# Patient Record
Sex: MALE | Race: WHITE | NOT HISPANIC OR LATINO
[De-identification: names, ages, dates, MRNs, and addresses within clinical notes are randomized per-mention and may not be internally consistent; named-entity substitution may affect disease eponyms.]

---

## 2017-04-10 ENCOUNTER — RESULT REVIEW (OUTPATIENT)
Age: 67
End: 2017-04-10

## 2017-04-11 ENCOUNTER — OUTPATIENT (OUTPATIENT)
Dept: OUTPATIENT SERVICES | Facility: HOSPITAL | Age: 67
LOS: 1 days | Discharge: HOME | End: 2017-04-11
Payer: COMMERCIAL

## 2017-04-11 ENCOUNTER — TRANSCRIPTION ENCOUNTER (OUTPATIENT)
Age: 67
End: 2017-04-11

## 2017-04-11 DIAGNOSIS — D21.9 BENIGN NEOPLASM OF CONNECTIVE AND OTHER SOFT TISSUE, UNSPECIFIED: ICD-10-CM

## 2017-04-11 PROCEDURE — 88304 TISSUE EXAM BY PATHOLOGIST: CPT

## 2017-04-11 PROCEDURE — 88304 TISSUE EXAM BY PATHOLOGIST: CPT | Mod: 26

## 2017-04-11 PROCEDURE — 11420 EXC H-F-NK-SP B9+MARG 0.5/<: CPT

## 2017-04-13 LAB — SURGICAL PATHOLOGY FINAL REPORT - CH: SIGNIFICANT CHANGE UP

## 2017-04-14 DIAGNOSIS — R22.31 LOCALIZED SWELLING, MASS AND LUMP, RIGHT UPPER LIMB: ICD-10-CM

## 2017-04-14 DIAGNOSIS — L91.0 HYPERTROPHIC SCAR: ICD-10-CM

## 2017-04-14 DIAGNOSIS — Z85.850 PERSONAL HISTORY OF MALIGNANT NEOPLASM OF THYROID: ICD-10-CM

## 2017-04-14 DIAGNOSIS — E89.0 POSTPROCEDURAL HYPOTHYROIDISM: ICD-10-CM

## 2017-04-14 DIAGNOSIS — I10 ESSENTIAL (PRIMARY) HYPERTENSION: ICD-10-CM

## 2017-12-22 ENCOUNTER — RESULT REVIEW (OUTPATIENT)
Age: 67
End: 2017-12-22

## 2018-10-13 ENCOUNTER — TRANSCRIPTION ENCOUNTER (OUTPATIENT)
Age: 68
End: 2018-10-13

## 2019-04-24 ENCOUNTER — TRANSCRIPTION ENCOUNTER (OUTPATIENT)
Age: 69
End: 2019-04-24

## 2019-12-18 ENCOUNTER — APPOINTMENT (OUTPATIENT)
Dept: GASTROENTEROLOGY | Facility: CLINIC | Age: 69
End: 2019-12-18
Payer: MEDICARE

## 2019-12-18 VITALS
WEIGHT: 176 LBS | HEART RATE: 94 BPM | SYSTOLIC BLOOD PRESSURE: 149 MMHG | HEIGHT: 70 IN | DIASTOLIC BLOOD PRESSURE: 95 MMHG | BODY MASS INDEX: 25.2 KG/M2

## 2019-12-18 PROCEDURE — 99212 OFFICE O/P EST SF 10 MIN: CPT

## 2019-12-18 NOTE — HISTORY OF PRESENT ILLNESS
[de-identified] : Mr. ALO BEYER is a 69 year old male presents for screening colonoscopy. Patient has no complaints of bowel issues, bleeding, abdominal pain, family history of colon cancer, GERD symptoms. Patient had last colonoscopy in 2017. Patient has a personal history of colon polyps. \par \par

## 2019-12-18 NOTE — PHYSICAL EXAM
[General Appearance - Alert] : alert [General Appearance - In No Acute Distress] : in no acute distress [Auscultation Breath Sounds / Voice Sounds] : lungs were clear to auscultation bilaterally [Heart Rate And Rhythm] : heart rate was normal and rhythm regular [Heart Sounds] : normal S1 and S2 [Heart Sounds Gallop] : no gallops [Murmurs] : no murmurs [Heart Sounds Pericardial Friction Rub] : no pericardial rub [Abdomen Tenderness] : non-tender [Bowel Sounds] : normal bowel sounds [Abdomen Soft] : soft [] : no hepato-splenomegaly [Abdomen Mass (___ Cm)] : no abdominal mass palpated

## 2020-01-24 ENCOUNTER — RESULT REVIEW (OUTPATIENT)
Age: 70
End: 2020-01-24

## 2020-01-24 ENCOUNTER — APPOINTMENT (OUTPATIENT)
Dept: GASTROENTEROLOGY | Facility: AMBULATORY MEDICAL SERVICES | Age: 70
End: 2020-01-24
Payer: MEDICARE

## 2020-01-24 PROCEDURE — 45385 COLONOSCOPY W/LESION REMOVAL: CPT

## 2020-01-24 PROCEDURE — 45380 COLONOSCOPY AND BIOPSY: CPT | Mod: 59

## 2021-10-09 ENCOUNTER — EMERGENCY (EMERGENCY)
Facility: HOSPITAL | Age: 71
LOS: 0 days | Discharge: HOME | End: 2021-10-09
Attending: STUDENT IN AN ORGANIZED HEALTH CARE EDUCATION/TRAINING PROGRAM
Payer: MEDICARE

## 2021-10-09 VITALS
HEART RATE: 82 BPM | OXYGEN SATURATION: 96 % | HEIGHT: 71 IN | TEMPERATURE: 98 F | WEIGHT: 179.9 LBS | RESPIRATION RATE: 16 BRPM | SYSTOLIC BLOOD PRESSURE: 144 MMHG | DIASTOLIC BLOOD PRESSURE: 84 MMHG

## 2021-10-09 DIAGNOSIS — T18.128A FOOD IN ESOPHAGUS CAUSING OTHER INJURY, INITIAL ENCOUNTER: ICD-10-CM
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
## 2021-10-09 DIAGNOSIS — Z85.850 PERSONAL HISTORY OF MALIGNANT NEOPLASM OF THYROID: ICD-10-CM

## 2021-10-09 DIAGNOSIS — I10 ESSENTIAL (PRIMARY) HYPERTENSION: ICD-10-CM

## 2021-10-09 PROCEDURE — 99282 EMERGENCY DEPT VISIT SF MDM: CPT

## 2021-10-09 NOTE — ED ADULT NURSE NOTE - OBJECTIVE STATEMENT
pt presents to er for evaluation of foreign body in throat. aaox4, no respiratiory distress. trachea midline, respirations even and unlabored.

## 2021-10-09 NOTE — ED STATDOCS - ATTENDING CONTRIBUTION TO CARE
I, Annie Mcduffie DO,  performed the initial face to face bedside interview with this patient regarding history of present illness, review of symptoms and relevant past medical, social and family history.  I completed an independent physical examination.  I was the initial provider who evaluated this patient. I have signed out the follow up of any pending tests (i.e. labs, radiological studies) to the ACP.  I have communicated the patient’s plan of care and disposition with the ACP.  The history, relevant review of systems, past medical and surgical history, medical decision making, and physical examination was documented by the scribe in my presence and I attest to the accuracy of the documentation.

## 2021-10-09 NOTE — ED STATDOCS - NSICDXPASTSURGICALHX_GEN_ALL_CORE_FT
PAST SURGICAL HISTORY:  Eye symptom lasik procedure x 5 years ago    Pilonidal cyst removed at age 30s    S/P foot surgery 2009 bilateral feet with hardware placement to metatarsals    S/P shoulder surgery 2011 bilateral for bone spurs    S/P thyroidectomy x 22 years ago

## 2021-10-09 NOTE — ED STATDOCS - PATIENT PORTAL LINK FT
You can access the FollowMyHealth Patient Portal offered by [facility] by registering at the following website: [email or web address]. By joining [facility]’s FollowMyHealth portal, you will also be able to view your health information using other applications (apps) compatible with our system.

## 2021-10-09 NOTE — ED STATDOCS - PROGRESS NOTE DETAILS
Jessica Mcduffie   Pt able to PO Challenge without difficulty. 72 y/o M presents with throat fb. pt was eating shrimp and felt a piece got stuck. Pt feels like food went down on his way to ED. No other complaints at this time. -Jose Pope PA-C pt feeling better, tolerating PO. -Jose Pope PA-C Reta DO: Patient to see GI- Dr. Tesfaye- patient's private physician in 1-2 days without fail; strict return precautions given.

## 2021-10-09 NOTE — ED STATDOCS - NSFOLLOWUPINSTRUCTIONS_ED_ALL_ED_FT
Log Out.      [employer]® CareNotes®     :  [facility]  	                       FOREIGN BODY INGESTION - General Information           Foreign Body Ingestion    WHAT YOU NEED TO KNOW:    What is foreign body ingestion? A foreign body is an object you swallowed. Examples include dental work and button batteries. A foreign body can cause problems as it moves through your digestive system.    What are the signs and symptoms of foreign body ingestion? You may not have any symptoms, or you may have any of the following:  •Drooling or vomiting       •Bloody vomit or rectal bleeding       •Chest pain, abdominal pain, or a feeling that something is stuck      How is foreign body ingestion diagnosed? Your healthcare provider will examine your throat, chest, and abdomen. Tell him what type of object you swallowed and when you swallowed it. He may use any of the following to find the object:   •A barium swallow or other x-rays may be used to check your neck, chest, and abdomen. You will drink thick liquid called barium while healthcare providers take x-rays. Barium helps your esophagus and stomach show up on x-rays.       •A metal detector may be used to look for coins or other metal objects in your body.       •A CT may be used to check for objects in your esophagus or stomach. You may be given contrast liquid to help your stomach show up better. Tell the healthcare provider if you have ever had an allergic reaction to contrast liquid.       •Endoscopy may be used to see the inside of your digestive system. A scope is a long, bendable tube with a light on the end of it. A camera attached to the scope will take pictures.       How is foreign body ingestion treated? Your healthcare provider may want to observe you for 24 hours or longer. Most objects pass through the digestive system and come out in a bowel movement. Objects that are small or smooth will often pass without a problem. Search for the object every time you have a bowel movement.     What can I do to prevent another foreign body ingestion?   •Cut your food into small pieces. Chew your food well before you swallow.       •Make sure dentures or other dental fixtures are secure. You may need to go to the dentist to have dental work repaired.      When should I seek immediate care?   •You have a fever.      •You have severe abdominal pain, nausea, or vomiting.       •Your vomit or saliva is bloody.      •Your bowel movements are black or bloody.       When should I contact my healthcare provider?   •You do not find the object in your bowel movement within 2 or 3 days.      •You do not want to eat because of abdominal pain or vomiting.      •You are drooling or hoarse.      •You have questions or concerns about your condition or care.      CARE AGREEMENT:    You have the right to help plan your care. Learn about your health condition and how it may be treated. Discuss treatment options with your healthcare providers to decide what care you want to receive. You always have the right to refuse treatment. FOREIGN BODY INGESTION - General Information           Foreign Body Ingestion    WHAT YOU NEED TO KNOW:    What is foreign body ingestion? A foreign body is an object you swallowed. Examples include dental work and button batteries. A foreign body can cause problems as it moves through your digestive system.    What are the signs and symptoms of foreign body ingestion? You may not have any symptoms, or you may have any of the following:  •Drooling or vomiting       •Bloody vomit or rectal bleeding       •Chest pain, abdominal pain, or a feeling that something is stuck      How is foreign body ingestion diagnosed? Your healthcare provider will examine your throat, chest, and abdomen. Tell him what type of object you swallowed and when you swallowed it. He may use any of the following to find the object:   •A barium swallow or other x-rays may be used to check your neck, chest, and abdomen. You will drink thick liquid called barium while healthcare providers take x-rays. Barium helps your esophagus and stomach show up on x-rays.       •A metal detector may be used to look for coins or other metal objects in your body.       •A CT may be used to check for objects in your esophagus or stomach. You may be given contrast liquid to help your stomach show up better. Tell the healthcare provider if you have ever had an allergic reaction to contrast liquid.       •Endoscopy may be used to see the inside of your digestive system. A scope is a long, bendable tube with a light on the end of it. A camera attached to the scope will take pictures.       How is foreign body ingestion treated? Your healthcare provider may want to observe you for 24 hours or longer. Most objects pass through the digestive system and come out in a bowel movement. Objects that are small or smooth will often pass without a problem. Search for the object every time you have a bowel movement.     What can I do to prevent another foreign body ingestion?   •Cut your food into small pieces. Chew your food well before you swallow.       •Make sure dentures or other dental fixtures are secure. You may need to go to the dentist to have dental work repaired.      When should I seek immediate care?   •You have a fever.      •You have severe abdominal pain, nausea, or vomiting.       •Your vomit or saliva is bloody.      •Your bowel movements are black or bloody.       When should I contact my healthcare provider?   •You do not find the object in your bowel movement within 2 or 3 days.      •You do not want to eat because of abdominal pain or vomiting.      •You are drooling or hoarse.      •You have questions or concerns about your condition or care.      CARE AGREEMENT:    You have the right to help plan your care. Learn about your health condition and how it may be treated. Discuss treatment options with your healthcare providers to decide what care you want to receive. You always have the right to refuse treatment.

## 2021-10-09 NOTE — ED STATDOCS - OBJECTIVE STATEMENT
72 y/o male with PMHx of HTN, thyroid CA s/p thyroidectomy on Synthroid presents to the ED c/o food stuck in throat. Pt. states he was eating shrimp and rice and states a big piece of shrimp got stuck in his throat. Pt states he tried drinking water to get the food down, but wasn't able to and he just spit the water back up. Pt thinks on way to ED, the food might have gone down. Is handling his secretions. Denies drooling. Denies SOB. States he has had multiple episodes of the same in the past. Has never had an endoscopy done. Takes Synthroid daily. Not on any blood thinners. +Vaccinated against Covid-19, due for booster next week. NKDA.

## 2021-10-09 NOTE — ED ADULT TRIAGE NOTE - CHIEF COMPLAINT QUOTE
Pt. presents to ED c/o food stuck in throat. Pt. states he was eating shrimp and rice and can feel the food stuck in throat. Pt. denies respiratory distress, able to speak in full sentences.

## 2021-10-13 ENCOUNTER — APPOINTMENT (OUTPATIENT)
Dept: GASTROENTEROLOGY | Facility: CLINIC | Age: 71
End: 2021-10-13
Payer: MEDICARE

## 2021-10-13 VITALS
DIASTOLIC BLOOD PRESSURE: 90 MMHG | HEART RATE: 90 BPM | HEIGHT: 70 IN | BODY MASS INDEX: 25.48 KG/M2 | SYSTOLIC BLOOD PRESSURE: 145 MMHG | WEIGHT: 178 LBS

## 2021-10-13 DIAGNOSIS — R13.10 DYSPHAGIA, UNSPECIFIED: ICD-10-CM

## 2021-10-13 PROCEDURE — 99213 OFFICE O/P EST LOW 20 MIN: CPT

## 2021-10-13 RX ORDER — SODIUM SULFATE, POTASSIUM SULFATE, MAGNESIUM SULFATE 17.5; 3.13; 1.6 G/ML; G/ML; G/ML
17.5-3.13-1.6 SOLUTION, CONCENTRATE ORAL
Qty: 1 | Refills: 0 | Status: DISCONTINUED | COMMUNITY
Start: 2019-12-18 | End: 2021-10-13

## 2021-10-13 NOTE — HISTORY OF PRESENT ILLNESS
[de-identified] : Mr. ALO BEYER is a 71 year old male with history of intermittent dysphagia for solids. This has been a long-standing problem and patient was able to manage symptoms by drinking water. Recently patient has been regurgitating food. There has been no associated weight loss heartburn. Patient was in the emergency room were symptoms resolved spontaneously. No other significant changes medical history.

## 2021-12-14 ENCOUNTER — APPOINTMENT (OUTPATIENT)
Dept: GASTROENTEROLOGY | Facility: AMBULATORY MEDICAL SERVICES | Age: 71
End: 2021-12-14
Payer: MEDICARE

## 2021-12-14 ENCOUNTER — RESULT REVIEW (OUTPATIENT)
Age: 71
End: 2021-12-14

## 2021-12-14 PROCEDURE — 43239 EGD BIOPSY SINGLE/MULTIPLE: CPT

## 2021-12-14 RX ORDER — PANTOPRAZOLE 40 MG/1
40 TABLET, DELAYED RELEASE ORAL DAILY
Qty: 90 | Refills: 3 | Status: ACTIVE | COMMUNITY
Start: 2021-12-14 | End: 1900-01-01

## 2022-03-02 ENCOUNTER — TRANSCRIPTION ENCOUNTER (OUTPATIENT)
Age: 72
End: 2022-03-02

## 2022-03-10 ENCOUNTER — APPOINTMENT (OUTPATIENT)
Dept: GASTROENTEROLOGY | Facility: CLINIC | Age: 72
End: 2022-03-10
Payer: MEDICARE

## 2022-03-10 VITALS
WEIGHT: 177 LBS | SYSTOLIC BLOOD PRESSURE: 135 MMHG | HEIGHT: 70 IN | DIASTOLIC BLOOD PRESSURE: 78 MMHG | BODY MASS INDEX: 25.34 KG/M2 | RESPIRATION RATE: 84 BRPM

## 2022-03-10 DIAGNOSIS — Z86.010 PERSONAL HISTORY OF COLONIC POLYPS: ICD-10-CM

## 2022-03-10 PROCEDURE — 99214 OFFICE O/P EST MOD 30 MIN: CPT

## 2022-03-10 RX ORDER — SODIUM SULFATE, POTASSIUM SULFATE, MAGNESIUM SULFATE 17.5; 3.13; 1.6 G/ML; G/ML; G/ML
17.5-3.13-1.6 SOLUTION, CONCENTRATE ORAL
Qty: 2 | Refills: 0 | Status: ACTIVE | COMMUNITY
Start: 2022-03-10 | End: 1900-01-01

## 2022-03-10 NOTE — ASSESSMENT
[FreeTextEntry1] : 70 y/o male presenting for epigastric discomfort. Reports persistent upper abdominal pain/constipation for the last 2 weeks. Most recent EGD was 12/2021 where ulcer was seen. Most recent colonoscopy was 1/2020 which showed diverticulosis, hemorrhoids, polyps. needs repeat this year. \par \par ?PUD vs ?gastritis\par \par PPI BID\par \par Metamucil daily for constipation, increase fiber intake, and water intake.\par \par Will plan for colonoscopy. Discussed with patient prep, procedure, and risks. Verbalized understanding. Prep sent to pharmacy. \par \par To f/u in 2 weeks, will consider imaging if symptoms not improved\par \par All questions answered. \par \par Discussed with Dr. Acosta. \par \par

## 2022-03-10 NOTE — HISTORY OF PRESENT ILLNESS
[de-identified] : 70 y/o male presenting for epigastric discomfort. Reports persistent upper abdominal pain for the last 2 weeks. Uncertain of any specific exacerbating or alleviating factors. Around the same time he noticed some increasing constipation. Not currently taking fiber supplement.  Most recent EGD was 12/2021 where ulcer was seen. Most recent colonoscopy was 1/2020 which showed diverticulosis, hemorrhoids, polyps. needs repeat this year. Denies chest pain, shortness of breath, nausea, vomiting, diarrhea, constipation, melena, hematochezia, unintentional weight changes, fevers, chills.

## 2022-03-23 ENCOUNTER — NON-APPOINTMENT (OUTPATIENT)
Age: 72
End: 2022-03-23

## 2022-03-25 ENCOUNTER — APPOINTMENT (OUTPATIENT)
Dept: GASTROENTEROLOGY | Facility: CLINIC | Age: 72
End: 2022-03-25
Payer: MEDICARE

## 2022-03-25 VITALS — BODY MASS INDEX: 26.05 KG/M2 | HEIGHT: 70 IN | WEIGHT: 182 LBS

## 2022-03-25 DIAGNOSIS — K59.00 CONSTIPATION, UNSPECIFIED: ICD-10-CM

## 2022-03-25 PROCEDURE — 99213 OFFICE O/P EST LOW 20 MIN: CPT

## 2022-03-26 NOTE — HISTORY OF PRESENT ILLNESS
[FreeTextEntry1] : 71 M presenting for follow up of epigastric discomfort. Notes since starting PPI BID he has noted significant relief of symptoms. Has been keeping food log, and has been able to target specific foods that make his symptoms worse. Has been managing constipation with diet and exercise. Overall feels improved. Had EGD 12/2021 which showed ulcer, planning for repeat colonoscopy 5/2022. Denies chest pain, shortness of breath, nausea, vomiting, abdominal pain, diarrhea, constipation, melena, hematochezia, unintentional weight changes, fevers, chills.

## 2022-03-26 NOTE — ASSESSMENT
[FreeTextEntry1] : 70 y/o male presenting for follow up of epigastric discomfort. Notes symptoms improvement with dietary modifications, and PPI BID.  Most recent EGD was 12/2021 where ulcer was seen. Most recent colonoscopy was 1/2020 which showed diverticulosis, hemorrhoids, polyps. Planning to have repeat 5/2022. \par \par C/w PPI BID. C/w healthy diet, and exercise. Encouraged to increase water intake. To f/u as needed. \par \par All questions answered. \par \par Discussed with Dr. Ward. \par \par

## 2022-05-03 ENCOUNTER — RESULT REVIEW (OUTPATIENT)
Age: 72
End: 2022-05-03

## 2022-05-03 ENCOUNTER — APPOINTMENT (OUTPATIENT)
Dept: GASTROENTEROLOGY | Facility: AMBULATORY MEDICAL SERVICES | Age: 72
End: 2022-05-03
Payer: MEDICARE

## 2022-05-03 PROCEDURE — 45380 COLONOSCOPY AND BIOPSY: CPT

## 2022-07-11 ENCOUNTER — RX RENEWAL (OUTPATIENT)
Age: 72
End: 2022-07-11

## 2022-07-11 RX ORDER — PANTOPRAZOLE 40 MG/1
40 TABLET, DELAYED RELEASE ORAL
Qty: 60 | Refills: 3 | Status: ACTIVE | COMMUNITY
Start: 2022-03-10 | End: 1900-01-01

## 2024-01-16 ENCOUNTER — APPOINTMENT (OUTPATIENT)
Dept: GASTROENTEROLOGY | Facility: CLINIC | Age: 74
End: 2024-01-16
Payer: MEDICARE

## 2024-01-16 VITALS
HEIGHT: 70 IN | SYSTOLIC BLOOD PRESSURE: 160 MMHG | BODY MASS INDEX: 25.62 KG/M2 | DIASTOLIC BLOOD PRESSURE: 88 MMHG | WEIGHT: 179 LBS

## 2024-01-16 DIAGNOSIS — Z76.89 PERSONS ENCOUNTERING HEALTH SERVICES IN OTHER SPECIFIED CIRCUMSTANCES: ICD-10-CM

## 2024-01-16 DIAGNOSIS — R10.13 EPIGASTRIC PAIN: ICD-10-CM

## 2024-01-16 PROCEDURE — 99212 OFFICE O/P EST SF 10 MIN: CPT

## 2024-01-16 NOTE — PHYSICAL EXAM

## 2024-01-16 NOTE — HISTORY OF PRESENT ILLNESS
[FreeTextEntry1] : Mr. ALO BEYER is a 73 year old male with history of gallstone disease in the past.  Patient has been feeling clinically well and has stopped his PPIs.  Recently, he started taking supplemental amino acids and noted some dyspepsia again.  He discontinued his medications and took PPIs for several days and now is pain-free.  Patient is eating normally.  There is been no weight loss or changes in medical history.

## 2024-01-16 NOTE — ASSESSMENT
[FreeTextEntry1] : 72 yo male with history of resolved abdominal discomfort.  Patient advised to call back with any further symptoms.  Dietary changes discussed with patient.

## 2024-11-06 ENCOUNTER — NON-APPOINTMENT (OUTPATIENT)
Age: 74
End: 2024-11-06

## 2024-11-07 ENCOUNTER — APPOINTMENT (OUTPATIENT)
Dept: GASTROENTEROLOGY | Facility: CLINIC | Age: 74
End: 2024-11-07
Payer: MEDICARE

## 2024-11-07 VITALS
BODY MASS INDEX: 26.05 KG/M2 | DIASTOLIC BLOOD PRESSURE: 76 MMHG | WEIGHT: 182 LBS | HEIGHT: 70 IN | SYSTOLIC BLOOD PRESSURE: 128 MMHG

## 2024-11-07 DIAGNOSIS — R14.0 ABDOMINAL DISTENSION (GASEOUS): ICD-10-CM

## 2024-11-07 DIAGNOSIS — R14.1 GAS PAIN: ICD-10-CM

## 2024-11-07 PROCEDURE — 99212 OFFICE O/P EST SF 10 MIN: CPT

## 2025-07-24 ENCOUNTER — APPOINTMENT (OUTPATIENT)
Dept: GASTROENTEROLOGY | Facility: AMBULATORY MEDICAL SERVICES | Age: 75
End: 2025-07-24
Payer: MEDICARE

## 2025-07-24 PROCEDURE — 45378 DIAGNOSTIC COLONOSCOPY: CPT
